# Patient Record
Sex: FEMALE | Race: WHITE | NOT HISPANIC OR LATINO | URBAN - NONMETROPOLITAN AREA
[De-identification: names, ages, dates, MRNs, and addresses within clinical notes are randomized per-mention and may not be internally consistent; named-entity substitution may affect disease eponyms.]

---

## 2017-03-10 ENCOUNTER — TELEPHONE (OUTPATIENT)
Dept: ENDOCRINOLOGY | Facility: CLINIC | Age: 42
End: 2017-03-10

## 2019-10-29 ENCOUNTER — OFFICE VISIT (OUTPATIENT)
Dept: OTOLARYNGOLOGY | Facility: CLINIC | Age: 44
End: 2019-10-29

## 2019-10-29 VITALS — OXYGEN SATURATION: 99 % | HEIGHT: 62 IN | WEIGHT: 220 LBS | BODY MASS INDEX: 40.48 KG/M2

## 2019-10-29 DIAGNOSIS — H60.62 CHRONIC NON-INFECTIVE OTITIS EXTERNA OF LEFT EAR, UNSPECIFIED TYPE: Primary | ICD-10-CM

## 2019-10-29 DIAGNOSIS — H92.02 LEFT EAR PAIN: ICD-10-CM

## 2019-10-29 PROCEDURE — 92504 EAR MICROSCOPY EXAMINATION: CPT | Performed by: OTOLARYNGOLOGY

## 2019-10-29 PROCEDURE — 99203 OFFICE O/P NEW LOW 30 MIN: CPT | Performed by: OTOLARYNGOLOGY

## 2019-10-29 RX ORDER — LOSARTAN POTASSIUM 25 MG/1
TABLET ORAL
Refills: 1 | COMMUNITY
Start: 2019-10-19

## 2019-10-29 RX ORDER — HUMAN INSULIN 100 [IU]/ML
INJECTION, SUSPENSION SUBCUTANEOUS
Refills: 5 | COMMUNITY
Start: 2019-10-15 | End: 2019-11-01 | Stop reason: SDUPTHER

## 2019-10-29 RX ORDER — SIMVASTATIN 10 MG
10 TABLET ORAL
Refills: 1 | COMMUNITY
Start: 2019-08-25

## 2019-11-01 NOTE — PROGRESS NOTES
"Subjective   Elizabeth Villarreal is a 44 y.o. female.     History of Present Illness   Patient has been having intermittent left ear pain and subjective fullness for 3 months.  Has been treated with antibiotics on 2 occasions and also eardrops.  Uses Q-tips.  Says her ear had improved for a brief period of time but feels \"cloggy\" today.  Also has intermittent shooting pain down into her jaw on the left.  Rates her pain score is 5 out of 10 today.  No previous otologic surgery.      The following portions of the patient's history were reviewed and updated as appropriate: allergies, current medications, past family history, past medical history, past social history, past surgical history and problem list.      Elizabeth Villarreal reports that she has quit smoking. She has never used smokeless tobacco.  Patient is not a tobacco user and has not been counseled for use of tobacco products    Family History   Problem Relation Age of Onset   • Diabetes Other    • Diabetes Father    • Diabetes Maternal Grandmother        No Known Allergies      Current Outpatient Medications:   •  citalopram (CeleXA) 40 MG tablet, , Disp: , Rfl:   •  Insulin NPH, Human,, Isophane, (HUMULIN N PEN) 100 UNIT/ML suspension pen-injector, Inject up to 40 units qhs, Disp: 1 pen, Rfl: 11  •  Insulin Regular Human (NOVOLIN R IJ), Inject  as directed., Disp: , Rfl:   •  losartan (COZAAR) 25 MG tablet, TAKE 1 TABLET BY MOUTH ONCE DAILY AS DIRECTED FOR 30 DAYS, Disp: , Rfl: 1  •  metFORMIN (GLUCOPHAGE) 1000 MG tablet, Take 1,000 mg by mouth 2 (Two) Times a Day., Disp: , Rfl:   •  norgestrel-ethinyl estradiol (LOW-OGESTREL,CRYSELLE) 0.3-30 MG-MCG per tablet, Take 1 tablet by mouth Daily., Disp: , Rfl:   •  simvastatin (ZOCOR) 10 MG tablet, Take 10 mg by mouth every night at bedtime., Disp: , Rfl: 1    Past Medical History:   Diagnosis Date   • Diabetes (CMS/HCC)    • Dyslipidemia    • Type II diabetes mellitus, uncontrolled (CMS/HCC)    • Vitamin D deficiency  "       Past Surgical History:   Procedure Laterality Date   • CHOLECYSTECTOMY           Review of Systems   Constitutional: Negative for fever.   HENT: Positive for ear pain and sore throat.    All other systems reviewed and are negative.          Objective   Physical Exam  General: Well-developed well-nourished female in no acute distress.  Alert and oriented x-3. Head: Normocephalic. Face: Symmetrical strength and appearance. PERRL. EOMI. Voice:Strong. Speech:Fluent  Ears: External ears no deformity, right ear canal shows no discharge.  Tympanic membrane intact clear mobile.  Left ear canal shows a large amount of uninfected squamous debris that is occluding the medial ear canal.  This is all cleaned under the microscope using instrumentation.  Beyond this tympanic membrane is noted to be intact clear mobile.  Nose: Nares show no discharge mass polyp or purulence.  Boggy mucosa is present.  No gross external deformity.  Septum: Midline  Oral cavity: Lips and gums without lesions.  Tongue and floor of mouth without lesions.  Parotid and submandibular ducts unobstructed.  No mucosal lesions on the buccal mucosa or vestibule of the mouth.  Pharynx: No erythema exudate mass or ulcer  Neck: No lymphadenopathy.  No thyromegaly.  Trachea and larynx midline.  No masses in the parotid or submandibular glands.  Left TMJ is mildly tender to palpation.        Assessment/Plan   Iris was seen today for ear problem and sinus problem.    Diagnoses and all orders for this visit:    Chronic non-infective otitis externa of left ear, unspecified type    Left ear pain        Plan: Ear cleaned as described above.  Prescribed hydrocortisone solution (Scalpicin) 3 drops twice a day for 7 days then as needed.  Soft diet and dental evaluation.  Advise no Q-tip use.  Follow-up with me as needed for nonresolution.

## 2024-05-10 ENCOUNTER — TELEPHONE (OUTPATIENT)
Dept: NEUROLOGY | Age: 49
End: 2024-05-10

## 2024-05-10 NOTE — TELEPHONE ENCOUNTER
1st attempt to call patient to schedule NP appt.  Left voicemail with call back number [phone].  Patient can be scheduled next available with Barb or Paul.

## 2024-09-11 ENCOUNTER — OFFICE VISIT (OUTPATIENT)
Dept: NEUROLOGY | Age: 49
End: 2024-09-11
Payer: COMMERCIAL

## 2024-09-11 VITALS
HEIGHT: 62 IN | OXYGEN SATURATION: 100 % | BODY MASS INDEX: 32.94 KG/M2 | WEIGHT: 179 LBS | HEART RATE: 96 BPM | DIASTOLIC BLOOD PRESSURE: 94 MMHG | SYSTOLIC BLOOD PRESSURE: 134 MMHG

## 2024-09-11 DIAGNOSIS — G43.009 MIGRAINE WITHOUT AURA AND WITHOUT STATUS MIGRAINOSUS, NOT INTRACTABLE: Primary | ICD-10-CM

## 2024-09-11 PROCEDURE — 99204 OFFICE O/P NEW MOD 45 MIN: CPT | Performed by: NURSE PRACTITIONER

## 2024-09-11 RX ORDER — VENLAFAXINE HYDROCHLORIDE 37.5 MG/1
CAPSULE, EXTENDED RELEASE ORAL
COMMUNITY
Start: 2024-08-27

## 2024-09-11 RX ORDER — ATOGEPANT 60 MG/1
60 TABLET ORAL DAILY
Qty: 30 TABLET | Refills: 3 | Status: SHIPPED | OUTPATIENT
Start: 2024-09-11

## 2024-09-11 RX ORDER — SIMVASTATIN 10 MG
TABLET ORAL
COMMUNITY

## 2024-09-11 RX ORDER — UBROGEPANT 100 MG/1
TABLET ORAL
Qty: 16 TABLET | Refills: 3 | Status: SHIPPED | OUTPATIENT
Start: 2024-09-11

## 2024-09-11 RX ORDER — METOPROLOL TARTRATE 25 MG/1
25 TABLET, FILM COATED ORAL 2 TIMES DAILY
COMMUNITY
Start: 2024-06-24

## 2024-09-11 RX ORDER — DULAGLUTIDE 4.5 MG/.5ML
INJECTION, SOLUTION SUBCUTANEOUS
COMMUNITY
Start: 2024-08-15

## 2024-09-11 RX ORDER — LOSARTAN POTASSIUM 25 MG/1
25 TABLET ORAL DAILY
COMMUNITY

## 2024-09-11 RX ORDER — DROSPIRENONE AND ETHINYL ESTRADIOL TABLETS 0.02-3(28)
1 KIT ORAL DAILY
COMMUNITY
Start: 2024-08-15

## 2024-09-11 RX ORDER — BUPROPION HYDROCHLORIDE 150 MG/1
TABLET ORAL
COMMUNITY
Start: 2024-09-08

## 2024-09-12 ENCOUNTER — TELEPHONE (OUTPATIENT)
Dept: NEUROLOGY | Age: 49
End: 2024-09-12

## 2024-09-17 ENCOUNTER — TELEPHONE (OUTPATIENT)
Dept: NEUROLOGY | Age: 49
End: 2024-09-17

## 2024-11-07 ENCOUNTER — TELEPHONE (OUTPATIENT)
Dept: NEUROSURGERY | Age: 49
End: 2024-11-07

## 2024-11-07 NOTE — TELEPHONE ENCOUNTER
Patient is requesting a return call in regards to getting the MRI/MRA done at [facility] due to it being denied at [facility]. Patient states she will like to get it done before the end of the year.  Please return her call    Thank you!

## 2024-11-19 NOTE — TELEPHONE ENCOUNTER
Patient is calling  for status of referral. Pt requesting new order/ referral for MRI.would like to schedule at [facility].   Please return call to update Patient on the referral status. Iris preferred call back time is Anytime.    Thank you!

## 2024-11-21 RX ORDER — SUMATRIPTAN SUCCINATE 100 MG/1
TABLET ORAL
Qty: 9 TABLET | Refills: 2 | Status: SHIPPED | OUTPATIENT
Start: 2024-11-21

## 2024-11-21 NOTE — TELEPHONE ENCOUNTER
Requested Prescriptions     Pending Prescriptions Disp Refills    SUMAtriptan (IMITREX) 100 MG tablet 9 tablet 2     Sig: Take 1 tablet by mouth once as needed for Migraine       Last Office Visit: 9/11/2024  Next Office Visit: 2/5/2025  Last Medication Refill: new med start

## 2024-12-03 ENCOUNTER — HOSPITAL ENCOUNTER (OUTPATIENT)
Dept: MRI IMAGING | Age: 49
Discharge: HOME | End: 2024-12-03
Payer: COMMERCIAL

## 2024-12-03 DIAGNOSIS — G43.009 MIGRAINE WITHOUT AURA AND WITHOUT STATUS MIGRAINOSUS, NOT INTRACTABLE: ICD-10-CM

## 2024-12-03 PROCEDURE — 6360000004 HC RX CONTRAST MEDICATION: Performed by: NURSE PRACTITIONER

## 2024-12-03 PROCEDURE — A9577 INJ MULTIHANCE: HCPCS | Performed by: NURSE PRACTITIONER

## 2024-12-03 PROCEDURE — 70553 MRI BRAIN STEM W/O & W/DYE: CPT

## 2024-12-03 RX ADMIN — GADOBENATE DIMEGLUMINE 17 ML: 529 INJECTION, SOLUTION INTRAVENOUS at 15:49

## 2025-01-08 DIAGNOSIS — G43.009 MIGRAINE WITHOUT AURA AND WITHOUT STATUS MIGRAINOSUS, NOT INTRACTABLE: ICD-10-CM

## 2025-01-08 RX ORDER — ATOGEPANT 60 MG/1
1 TABLET ORAL DAILY
Qty: 30 TABLET | Refills: 0 | Status: SHIPPED | OUTPATIENT
Start: 2025-01-08

## 2025-01-08 NOTE — TELEPHONE ENCOUNTER
Requested Prescriptions     Pending Prescriptions Disp Refills    QULIPTA 60 MG TABS [Pharmacy Med Name: Qulipta 60 MG Oral Tablet] 30 tablet 0     Sig: Take 1 tablet by mouth once daily       Last Office Visit: 9/11/2024  Next Office Visit: 2/5/2025  Last Medication Refill:9/11/2024 with 3 RF

## 2025-02-05 ENCOUNTER — OFFICE VISIT (OUTPATIENT)
Dept: NEUROLOGY | Age: 50
End: 2025-02-05
Payer: COMMERCIAL

## 2025-02-05 VITALS
BODY MASS INDEX: 32.94 KG/M2 | HEART RATE: 93 BPM | OXYGEN SATURATION: 99 % | DIASTOLIC BLOOD PRESSURE: 83 MMHG | HEIGHT: 62 IN | SYSTOLIC BLOOD PRESSURE: 118 MMHG | WEIGHT: 179 LBS

## 2025-02-05 DIAGNOSIS — G43.009 MIGRAINE WITHOUT AURA AND WITHOUT STATUS MIGRAINOSUS, NOT INTRACTABLE: Primary | ICD-10-CM

## 2025-02-05 PROCEDURE — 99213 OFFICE O/P EST LOW 20 MIN: CPT | Performed by: NURSE PRACTITIONER

## 2025-02-05 RX ORDER — RIZATRIPTAN BENZOATE 10 MG/1
TABLET ORAL
Qty: 9 TABLET | Refills: 5 | Status: SHIPPED | OUTPATIENT
Start: 2025-02-05

## 2025-02-05 RX ORDER — TIRZEPATIDE 7.5 MG/.5ML
INJECTION, SOLUTION SUBCUTANEOUS
COMMUNITY
Start: 2025-01-24

## 2025-02-05 NOTE — PROGRESS NOTES
[facility] Office Note      Patient:   Lisette Barajas  MR#:    [id number]  Account Number:                         YOB: 1975  Date of Evaluation:  2/5/2025  Time of Note:                          1:58 PM  Primary/Referring Physician:  Waldemar Gonsalves MD   Consulting Physician:  Corine Patterson DNP, APRN    FOLLOW UP    Chief Complaint   Patient presents with    Follow-up    Migraine     Pt states things are some better, reports migraine a few days ago she couldn't get rid of.      HISTORY OF PRESENT ILLNESS    Lisette Barajas is a 49 y.o. year old female here for follow up of headaches. She has noted improvement in frequency and intensity of headaches since last visit. No change in characteristics of headaches. Headache pain is bilateral, retro orbital with radiation posteriorly. Pain described as sharp, stabbing, nagging. She notes nausea, light/sound sensitivity. She notes pulsatile tinnitus. She denies clear aura. She does note intermittent visual changes with headaches described as a squiggly lines. She does note blurred vision when headaches are more severe. Denies marcelo visual loss. No worsening with cough/valsalva. Does note intermittent dizziness with headaches. She does note a post migraine syndrome described as feeling \"hungover\" the following day. She is on Qulipta, no side effects. She has tried Imitrex but noted dizziness, nausea. Has tried and failed Excedrin. Ubrelvy is beneficial but issues with insurance. She is noting 4 migraines in a month now, over a 50% decrease, reports improvement in quality of life. Triggers include weather changes and hormonal changes. She denies daily analgesic use. No prior preventative or abortive therapies for migraines. No history of kidney stones. No family history of aneurysm.     Past Medical History:   Diagnosis Date    Diabetes mellitus (HCC)     HLD (hyperlipidemia)      History reviewed. No pertinent surgical history.    History reviewed. No

## 2025-02-07 DIAGNOSIS — G43.009 MIGRAINE WITHOUT AURA AND WITHOUT STATUS MIGRAINOSUS, NOT INTRACTABLE: ICD-10-CM

## 2025-02-07 NOTE — TELEPHONE ENCOUNTER
Requested Prescriptions     Pending Prescriptions Disp Refills    Atogepant (QULIPTA) 60 MG TABS [Pharmacy Med Name: Qulipta 60 MG Oral Tablet] 30 tablet 3     Sig: Take 1 tablet by mouth once daily       Last Office Visit: 2/5/2025  Next Office Visit: 6/30/2025  Last Medication Refill: 1-8-2025

## 2025-02-10 RX ORDER — ATOGEPANT 60 MG/1
1 TABLET ORAL DAILY
Qty: 30 TABLET | Refills: 3 | Status: SHIPPED | OUTPATIENT
Start: 2025-02-10

## 2025-02-27 ENCOUNTER — CLINICAL DOCUMENTATION (OUTPATIENT)
Dept: NEUROLOGY | Age: 50
End: 2025-02-27

## 2025-02-27 NOTE — PROGRESS NOTES
Lisette Barajas (Key: [id number]) - [id number]  Qulipta 60MG tablets  status: PA Response - ApprovedCreated: February 20th, 2025Sent: February 27th, 2025

## 2025-04-21 ENCOUNTER — TELEPHONE (OUTPATIENT)
Dept: NEUROLOGY | Age: 50
End: 2025-04-21

## 2025-04-21 NOTE — TELEPHONE ENCOUNTER
Approved  PA Detail   Prior authorization approved  Payer: Olivia Provender and [facility] - Commercial Case ID: [id number]  Approval Details    Authorization number: [id number]  Authorized from 2025  Electronic appeal: Not supported  Prior auth initiated by: [address] - P [phone] - F [phone]  View History  Pharmacy Benefits   Open Encounter KRISTA HALL BB CDH-N [id number] ([facility])    Covered: Retail, Mail Order, Specialty    Unknown: Long-Term Care  Member ID: [id number] BIN: [id number] : 1975   Group ID: [id number] PCN: [id number] Legal sex: F   Group name: [id number]   Address: [address] [place], [place] [id number]    Medication Being Authorized    QULIPTA 60 MG TABS  Take 1 tablet by mouth once daily  Dispense: 30 tablet Refills: 3   Start: 2/10/2025   Class: Normal Diagnoses: Migraine without aura and without status migrainosus, not intractable   This order has been released to its destination.  To be filled at: [facility] [employer] [address] [place] [address] [address] - P [phone] - f [phone]  Prior Authorization History for Atogepant (QULIPTA) 60 MG TABS    7 months ago Approved

## 2025-04-30 ENCOUNTER — TELEPHONE (OUTPATIENT)
Dept: NEUROLOGY | Age: 50
End: 2025-04-30

## 2025-07-04 DIAGNOSIS — G43.009 MIGRAINE WITHOUT AURA AND WITHOUT STATUS MIGRAINOSUS, NOT INTRACTABLE: ICD-10-CM

## 2025-07-07 DIAGNOSIS — G43.009 MIGRAINE WITHOUT AURA AND WITHOUT STATUS MIGRAINOSUS, NOT INTRACTABLE: ICD-10-CM

## 2025-07-07 RX ORDER — ATOGEPANT 60 MG/1
1 TABLET ORAL DAILY
Qty: 30 TABLET | Refills: 5 | Status: SHIPPED | OUTPATIENT
Start: 2025-07-07 | End: 2025-07-07

## 2025-07-07 RX ORDER — ATOGEPANT 60 MG/1
1 TABLET ORAL DAILY
Qty: 30 TABLET | Refills: 5 | Status: SHIPPED | OUTPATIENT
Start: 2025-07-07

## 2025-07-07 NOTE — TELEPHONE ENCOUNTER
Requested Prescriptions     Pending Prescriptions Disp Refills    QULIPTA 60 MG TABS [Pharmacy Med Name: Qulipta 60 MG Oral Tablet] 30 tablet 5     Sig: Take 1 tablet by mouth once daily       Last Office Visit: 2/5/2025  Next Office Visit: 10/15/2025  Last Medication Refill:6/4/2025

## 2025-07-07 NOTE — TELEPHONE ENCOUNTER
Requested Prescriptions     Pending Prescriptions Disp Refills    QULIPTA 60 MG TABS [Pharmacy Med Name: Qulipta 60 MG Oral Tablet] 30 tablet 5     Sig: Take 1 tablet by mouth once daily       Last Office Visit: 2/5/2025  Next Office Visit: 10/15/2025  Last Medication Refill:6/9/2025